# Patient Record
(demographics unavailable — no encounter records)

---

## 2024-12-21 NOTE — HEALTH RISK ASSESSMENT
[Good] : ~his/her~  mood as  good [Yes] : Yes [0] : 2) Feeling down, depressed, or hopeless: Not at all (0) [PHQ-2 Negative - No further assessment needed] : PHQ-2 Negative - No further assessment needed [Current] : Current [Patient reported PAP Smear was normal] : Patient reported PAP Smear was normal [de-identified] : socially [[id number]] : 0 [de-identified] : socially [PapSmearDate] : 4/23

## 2024-12-21 NOTE — REVIEW OF SYSTEMS
[Negative] : Heme/Lymph [Chest Pain] : chest pain [Palpitations] : no palpitations [Leg Claudication] : no leg claudication [Lower Ext Edema] : no lower extremity edema [Orthopnea] : no orthopnea [Paroxysmal Nocturnal Dyspnea] : no paroxysmal nocturnal dyspnea

## 2024-12-21 NOTE — PHYSICAL EXAM
[No Acute Distress] : no acute distress [Well-Appearing] : well-appearing [Normal Sclera/Conjunctiva] : normal sclera/conjunctiva [PERRL] : pupils equal round and reactive to light [EOMI] : extraocular movements intact [Normal Outer Ear/Nose] : the outer ears and nose were normal in appearance [Normal Oropharynx] : the oropharynx was normal [No JVD] : no jugular venous distention [No Lymphadenopathy] : no lymphadenopathy [Supple] : supple [Thyroid Normal, No Nodules] : the thyroid was normal and there were no nodules present [No Respiratory Distress] : no respiratory distress  [No Accessory Muscle Use] : no accessory muscle use [Clear to Auscultation] : lungs were clear to auscultation bilaterally [Normal Rate] : normal rate  [Regular Rhythm] : with a regular rhythm [Normal S1, S2] : normal S1 and S2 [No Murmur] : no murmur heard [No Varicosities] : no varicosities [Pedal Pulses Present] : the pedal pulses are present [No Edema] : there was no peripheral edema [No Extremity Clubbing/Cyanosis] : no extremity clubbing/cyanosis [Soft] : abdomen soft [Non Tender] : non-tender [Non-distended] : non-distended [No Masses] : no abdominal mass palpated [No HSM] : no HSM [Normal Bowel Sounds] : normal bowel sounds [No CVA Tenderness] : no CVA  tenderness [No Spinal Tenderness] : no spinal tenderness [No Joint Swelling] : no joint swelling [Grossly Normal Strength/Tone] : grossly normal strength/tone [No Rash] : no rash [Coordination Grossly Intact] : coordination grossly intact [No Focal Deficits] : no focal deficits [Normal Gait] : normal gait [Deep Tendon Reflexes (DTR)] : deep tendon reflexes were 2+ and symmetric [Normal Affect] : the affect was normal [Normal Insight/Judgement] : insight and judgment were intact [de-identified] : Right breast: at 3 o'clock area, 1 cm linear soft nodular mass, movable and minimum tender

## 2024-12-21 NOTE — HISTORY OF PRESENT ILLNESS
[de-identified] : 23 years old female has past medical history of anxiety, hypothyroidism, low back pain, chronic smoker, and vitamin D deficiency, came back for annual physical exam.  Pt's period was 2 weeks late, having period now. Pt and  have been together for 3 years, no pregnancy. Pt wants pregnancy test and infertility workup.  Pt complained right breast lump, causing mild pain. Pt is having period now, pain started before period.  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 Pt had echocardiogram at that time. Pt sometimes felt upper middle chest pain when she was under stress. Pt wants to do STD check

## 2024-12-21 NOTE — HISTORY OF PRESENT ILLNESS
[de-identified] : 23 years old female has past medical history of anxiety, hypothyroidism, low back pain, chronic smoker, and vitamin D deficiency, came back for annual physical exam.  Pt's period was 2 weeks late, having period now. Pt and  have been together for 3 years, no pregnancy. Pt wants pregnancy test and infertility workup.  Pt complained right breast lump, causing mild pain. Pt is having period now, pain started before period.  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 Pt had echocardiogram at that time. Pt sometimes felt upper middle chest pain when she was under stress. Pt wants to do STD check

## 2024-12-21 NOTE — HEALTH RISK ASSESSMENT
[Good] : ~his/her~  mood as  good [Yes] : Yes [0] : 2) Feeling down, depressed, or hopeless: Not at all (0) [PHQ-2 Negative - No further assessment needed] : PHQ-2 Negative - No further assessment needed [Current] : Current [Patient reported PAP Smear was normal] : Patient reported PAP Smear was normal [de-identified] : socially [[id number]] : 0 [de-identified] : socially [PapSmearDate] : 4/23

## 2024-12-21 NOTE — PHYSICAL EXAM
[No Acute Distress] : no acute distress [Well-Appearing] : well-appearing [Normal Sclera/Conjunctiva] : normal sclera/conjunctiva [PERRL] : pupils equal round and reactive to light [EOMI] : extraocular movements intact [Normal Outer Ear/Nose] : the outer ears and nose were normal in appearance [Normal Oropharynx] : the oropharynx was normal [No JVD] : no jugular venous distention [No Lymphadenopathy] : no lymphadenopathy [Supple] : supple [Thyroid Normal, No Nodules] : the thyroid was normal and there were no nodules present [No Respiratory Distress] : no respiratory distress  [No Accessory Muscle Use] : no accessory muscle use [Clear to Auscultation] : lungs were clear to auscultation bilaterally [Normal Rate] : normal rate  [Regular Rhythm] : with a regular rhythm [Normal S1, S2] : normal S1 and S2 [No Murmur] : no murmur heard [No Varicosities] : no varicosities [Pedal Pulses Present] : the pedal pulses are present [No Edema] : there was no peripheral edema [No Extremity Clubbing/Cyanosis] : no extremity clubbing/cyanosis [Soft] : abdomen soft [Non Tender] : non-tender [Non-distended] : non-distended [No Masses] : no abdominal mass palpated [No HSM] : no HSM [Normal Bowel Sounds] : normal bowel sounds [No CVA Tenderness] : no CVA  tenderness [No Spinal Tenderness] : no spinal tenderness [No Joint Swelling] : no joint swelling [Grossly Normal Strength/Tone] : grossly normal strength/tone [No Rash] : no rash [Coordination Grossly Intact] : coordination grossly intact [No Focal Deficits] : no focal deficits [Normal Gait] : normal gait [Deep Tendon Reflexes (DTR)] : deep tendon reflexes were 2+ and symmetric [Normal Affect] : the affect was normal [Normal Insight/Judgement] : insight and judgment were intact [de-identified] : Right breast: at 3 o'clock area, 1 cm linear soft nodular mass, movable and minimum tender

## 2025-03-16 NOTE — HISTORY OF PRESENT ILLNESS
[de-identified] : 23 years old female has past medical history of anxiety, hypothyroidism, low back pain, chronic smoker, and vitamin D deficiency, came back for follow up.  room air

## 2025-04-26 NOTE — HISTORY OF PRESENT ILLNESS
[de-identified] : 23 years old female has past medical history of anxiety, hypothyroidism, low back pain, chronic smoker, and vitamin D deficiency, came back for follow up. Pt had blood tests in 24, never came back to review test results. HDL was 47, vitamin D 22.1 and herpes simplex 1 AB IGG positive. Reports were reviewed with pt in details. Other blood tests came back wnl. Pt stated the bump in right breast still there before and after period for past 3 months, causing pain.  Pt had medical  last month and had birth control implant insertion at left upper arm.  Pt has anxiety disorder, sometimes having symptoms of panic attack with chest heaviness, and palpitation. Symptoms subsided after she calms down. Pt wants to see cardiologist and psychotherapy

## 2025-04-26 NOTE — HISTORY OF PRESENT ILLNESS
[de-identified] : 23 years old female has past medical history of anxiety, hypothyroidism, low back pain, chronic smoker, and vitamin D deficiency, came back for follow up. Pt had blood tests in 24, never came back to review test results. HDL was 47, vitamin D 22.1 and herpes simplex 1 AB IGG positive. Reports were reviewed with pt in details. Other blood tests came back wnl. Pt stated the bump in right breast still there before and after period for past 3 months, causing pain.  Pt had medical  last month and had birth control implant insertion at left upper arm.  Pt has anxiety disorder, sometimes having symptoms of panic attack with chest heaviness, and palpitation. Symptoms subsided after she calms down. Pt wants to see cardiologist and psychotherapy

## 2025-04-26 NOTE — PHYSICAL EXAM
[Normal] : normal rate, regular rhythm, normal S1 and S2 and no murmur heard [de-identified] : Right breast: pea size nodule at medial area, movable, mildly tender, Left breast wnl.  [de-identified] : Anxious looking, good eye to eye contact and engaged conversation well.

## 2025-04-26 NOTE — REVIEW OF SYSTEMS
[Negative] : Gastrointestinal [Chest Pain] : chest pain [Palpitations] : palpitations [Orthopnea] : no orthopnea [Paroxysmal Nocturnal Dyspnea] : no paroxysmal nocturnal dyspnea [Suicidal] : not suicidal [Insomnia] : no insomnia [Anxiety] : anxiety [FreeTextEntry1] : Breast bump at right breast

## 2025-04-26 NOTE — PHYSICAL EXAM
[Normal] : normal rate, regular rhythm, normal S1 and S2 and no murmur heard [de-identified] : Right breast: pea size nodule at medial area, movable, mildly tender, Left breast wnl.  [de-identified] : Anxious looking, good eye to eye contact and engaged conversation well.